# Patient Record
Sex: MALE | Race: BLACK OR AFRICAN AMERICAN | NOT HISPANIC OR LATINO | URBAN - METROPOLITAN AREA
[De-identification: names, ages, dates, MRNs, and addresses within clinical notes are randomized per-mention and may not be internally consistent; named-entity substitution may affect disease eponyms.]

---

## 2024-09-04 ENCOUNTER — OUTPATIENT (OUTPATIENT)
Dept: OUTPATIENT SERVICES | Facility: HOSPITAL | Age: 24
LOS: 1 days | Discharge: HOME | End: 2024-09-04
Payer: MEDICAID

## 2024-09-10 PROCEDURE — 90792 PSYCH DIAG EVAL W/MED SRVCS: CPT

## 2024-09-19 DIAGNOSIS — F25.9 SCHIZOAFFECTIVE DISORDER, UNSPECIFIED: ICD-10-CM

## 2024-11-11 ENCOUNTER — EMERGENCY (EMERGENCY)
Facility: HOSPITAL | Age: 24
LOS: 0 days | Discharge: HOME | End: 2024-11-11
Attending: STUDENT IN AN ORGANIZED HEALTH CARE EDUCATION/TRAINING PROGRAM
Payer: MEDICAID

## 2024-11-11 VITALS
TEMPERATURE: 98 F | HEART RATE: 70 BPM | OXYGEN SATURATION: 99 % | SYSTOLIC BLOOD PRESSURE: 119 MMHG | RESPIRATION RATE: 20 BRPM | DIASTOLIC BLOOD PRESSURE: 73 MMHG

## 2024-11-11 VITALS
OXYGEN SATURATION: 100 % | SYSTOLIC BLOOD PRESSURE: 118 MMHG | TEMPERATURE: 98 F | HEART RATE: 102 BPM | HEIGHT: 75 IN | RESPIRATION RATE: 20 BRPM | WEIGHT: 186.95 LBS | DIASTOLIC BLOOD PRESSURE: 78 MMHG

## 2024-11-11 DIAGNOSIS — R00.2 PALPITATIONS: ICD-10-CM

## 2024-11-11 DIAGNOSIS — R07.89 OTHER CHEST PAIN: ICD-10-CM

## 2024-11-11 DIAGNOSIS — F17.210 NICOTINE DEPENDENCE, CIGARETTES, UNCOMPLICATED: ICD-10-CM

## 2024-11-11 DIAGNOSIS — R00.0 TACHYCARDIA, UNSPECIFIED: ICD-10-CM

## 2024-11-11 DIAGNOSIS — K58.9 IRRITABLE BOWEL SYNDROME, UNSPECIFIED: ICD-10-CM

## 2024-11-11 LAB
ALBUMIN SERPL ELPH-MCNC: 4.1 G/DL — SIGNIFICANT CHANGE UP (ref 3.3–5)
ALP SERPL-CCNC: 66 U/L — SIGNIFICANT CHANGE UP (ref 40–120)
ALT FLD-CCNC: 33 U/L — SIGNIFICANT CHANGE UP (ref 12–78)
ANION GAP SERPL CALC-SCNC: 6 MMOL/L — SIGNIFICANT CHANGE UP (ref 5–17)
APTT BLD: 31.3 SEC — SIGNIFICANT CHANGE UP (ref 24.5–35.6)
AST SERPL-CCNC: 39 U/L — HIGH (ref 15–37)
BASOPHILS # BLD AUTO: 0.04 K/UL — SIGNIFICANT CHANGE UP (ref 0–0.2)
BASOPHILS NFR BLD AUTO: 0.6 % — SIGNIFICANT CHANGE UP (ref 0–2)
BILIRUB SERPL-MCNC: 1.2 MG/DL — SIGNIFICANT CHANGE UP (ref 0.2–1.2)
BUN SERPL-MCNC: 9 MG/DL — SIGNIFICANT CHANGE UP (ref 7–23)
CALCIUM SERPL-MCNC: 8.9 MG/DL — SIGNIFICANT CHANGE UP (ref 8.5–10.1)
CHLORIDE SERPL-SCNC: 109 MMOL/L — HIGH (ref 96–108)
CO2 SERPL-SCNC: 24 MMOL/L — SIGNIFICANT CHANGE UP (ref 22–31)
CREAT SERPL-MCNC: 1.07 MG/DL — SIGNIFICANT CHANGE UP (ref 0.5–1.3)
EGFR: 99 ML/MIN/1.73M2 — SIGNIFICANT CHANGE UP
EOSINOPHIL # BLD AUTO: 0.04 K/UL — SIGNIFICANT CHANGE UP (ref 0–0.5)
EOSINOPHIL NFR BLD AUTO: 0.6 % — SIGNIFICANT CHANGE UP (ref 0–6)
GLUCOSE SERPL-MCNC: 105 MG/DL — HIGH (ref 70–99)
HCT VFR BLD CALC: 36.6 % — LOW (ref 39–50)
HGB BLD-MCNC: 12.4 G/DL — LOW (ref 13–17)
IMM GRANULOCYTES NFR BLD AUTO: 0.1 % — SIGNIFICANT CHANGE UP (ref 0–0.9)
INR BLD: 1.31 RATIO — HIGH (ref 0.85–1.16)
LYMPHOCYTES # BLD AUTO: 1.27 K/UL — SIGNIFICANT CHANGE UP (ref 1–3.3)
LYMPHOCYTES # BLD AUTO: 17.8 % — SIGNIFICANT CHANGE UP (ref 13–44)
MAGNESIUM SERPL-MCNC: 1.8 MG/DL — SIGNIFICANT CHANGE UP (ref 1.6–2.6)
MCHC RBC-ENTMCNC: 28.6 PG — SIGNIFICANT CHANGE UP (ref 27–34)
MCHC RBC-ENTMCNC: 33.9 G/DL — SIGNIFICANT CHANGE UP (ref 32–36)
MCV RBC AUTO: 84.5 FL — SIGNIFICANT CHANGE UP (ref 80–100)
MONOCYTES # BLD AUTO: 0.56 K/UL — SIGNIFICANT CHANGE UP (ref 0–0.9)
MONOCYTES NFR BLD AUTO: 7.9 % — SIGNIFICANT CHANGE UP (ref 2–14)
NEUTROPHILS # BLD AUTO: 5.21 K/UL — SIGNIFICANT CHANGE UP (ref 1.8–7.4)
NEUTROPHILS NFR BLD AUTO: 73 % — SIGNIFICANT CHANGE UP (ref 43–77)
NRBC # BLD: 0 /100 WBCS — SIGNIFICANT CHANGE UP (ref 0–0)
PLATELET # BLD AUTO: 177 K/UL — SIGNIFICANT CHANGE UP (ref 150–400)
POTASSIUM SERPL-MCNC: 3.5 MMOL/L — SIGNIFICANT CHANGE UP (ref 3.5–5.3)
POTASSIUM SERPL-SCNC: 3.5 MMOL/L — SIGNIFICANT CHANGE UP (ref 3.5–5.3)
PROT SERPL-MCNC: 7.6 GM/DL — SIGNIFICANT CHANGE UP (ref 6–8.3)
PROTHROM AB SERPL-ACNC: 15.1 SEC — HIGH (ref 9.9–13.4)
RBC # BLD: 4.33 M/UL — SIGNIFICANT CHANGE UP (ref 4.2–5.8)
RBC # FLD: 13 % — SIGNIFICANT CHANGE UP (ref 10.3–14.5)
SODIUM SERPL-SCNC: 139 MMOL/L — SIGNIFICANT CHANGE UP (ref 135–145)
TROPONIN I, HIGH SENSITIVITY RESULT: 8.3 NG/L — SIGNIFICANT CHANGE UP
WBC # BLD: 7.13 K/UL — SIGNIFICANT CHANGE UP (ref 3.8–10.5)
WBC # FLD AUTO: 7.13 K/UL — SIGNIFICANT CHANGE UP (ref 3.8–10.5)

## 2024-11-11 PROCEDURE — 99284 EMERGENCY DEPT VISIT MOD MDM: CPT

## 2024-11-11 PROCEDURE — 93010 ELECTROCARDIOGRAM REPORT: CPT

## 2024-11-11 RX ORDER — SODIUM CHLORIDE 9 MG/ML
1000 INJECTION, SOLUTION INTRAMUSCULAR; INTRAVENOUS; SUBCUTANEOUS ONCE
Refills: 0 | Status: COMPLETED | OUTPATIENT
Start: 2024-11-11 | End: 2024-11-11

## 2024-11-11 RX ADMIN — SODIUM CHLORIDE 1000 MILLILITER(S): 9 INJECTION, SOLUTION INTRAMUSCULAR; INTRAVENOUS; SUBCUTANEOUS at 15:41

## 2024-11-11 NOTE — ED ADULT NURSE NOTE - OBJECTIVE STATEMENT
received er h28 biba c/o heart racing sensation and mild lightheaded sensation started today no shortness of breath mild chest tightness states didn't smoke marijuana x 4 days substituted nicotine instead denies other drug use a&ox4

## 2024-11-11 NOTE — ED PROVIDER NOTE - NSFOLLOWUPINSTRUCTIONS_ED_ALL_ED_FT
Rest, drink plenty of fluids.  Advance activity as tolerated.  Continue all previously prescribed medications as directed.  Follow up with your primary care physician in 48-72 hours- bring copies of your results.  Return to the ER for worsening or persistent symptoms, and/or ANY NEW OR CONCERNING SYMPTOMS. If you have issues obtaining follow up, please call: [phone] to obtain a doctor or specialist who takes your insurance in your area.  You may call [phone] to make an appointment with the internal medicine clinic.

## 2024-11-11 NOTE — ED ADULT NURSE NOTE - NSFALLUNIVINTERV_ED_ALL_ED
Bed/Stretcher in lowest position, wheels locked, appropriate side rails in place/Call bell, personal items and telephone in reach/Instruct patient to call for assistance before getting out of bed/chair/stretcher/Non-slip footwear applied when patient is off stretcher/[place] to call system/Physically safe environment - no spills, clutter or unnecessary equipment/Purposeful proactive rounding/Room/bathroom lighting operational, light cord in reach

## 2024-11-11 NOTE — ED PROVIDER NOTE - CHPI ED SYMPTOMS NEG
Overnight, when she was lying down, patient had recurrence of her dizziness. This was worse when getting up to go to the bathroom. She says it was true room spinning. No symptoms this morning. Sugar 78 at the time. No arrhythmias noted on tele.  
no cough/no dizziness/no fever/no nausea/no shortness of breath/no syncope/no vomiting/no chills

## 2024-11-11 NOTE — ED PROVIDER NOTE - ATTENDING APP SHARED VISIT CONTRIBUTION OF CARE
25 y/o M with PMH IBS here with palpitations today. Pt states he was smoking marijuana and cigarettes and felt his heart beating fast. Pt also reports having chest tightness. Denies dyspnea. Pt states he smokes marijuana daily but stopped smoking for 4 days and started again today. Denies nausea, vomiting, abdominal pain, recent travel, sick contact. Pt reports occasional drinking. Denies other drugs. Pt denies family history of heart disease or blood clots.  In the ED, mildly tachycardic.  EKG WNL  Suspect symptoms related to smoking marijuana  Plan for labs, patient declines CXR at this time, has equal breath sounds and is not hypoxic

## 2024-11-11 NOTE — ED PROVIDER NOTE - PATIENT PORTAL LINK FT
You can access the FollowMyHealth Patient Portal offered by [facility] by registering at the following website: [email or web address]. By joining [employer]’s FollowMyHealth portal, you will also be able to view your health information using other applications (apps) compatible with our system.

## 2024-11-11 NOTE — ED ADULT NURSE NOTE - CHIEF COMPLAINT QUOTE
Heart beating fast x 30 min, Left arm #18 IV smoked marijuana and increased nicotine cigarettes x4 days H/O IBS

## 2024-11-11 NOTE — ED PROVIDER NOTE - CLINICAL SUMMARY MEDICAL DECISION MAKING FREE TEXT BOX
25 y/o male with ibs here with palpitations today after smoking cigarettes and marijuana. Vs stable mildly tachycardic 102.   Symptoms likely due to smoking marijuana. Low suspicion for PE, acs,  as pt is low risk. NO hypoxic.   Will obtain cardiac labs, ekg, cxr, ivf and reassess. 25 y/o male with ibs here with palpitations today after smoking cigarettes and marijuana. Vs stable mildly tachycardic 102.   Symptoms likely due to smoking marijuana. Low suspicion for PE, acs,  as pt is low risk. NO hypoxic.   Will obtain cardiac labs, ekg, cxr, ivf and reassess.    labs reviewed and unremarkable. Troponin negative.   Pt refuse cxr and wants to leave. Pt reports feeling better. Vs stable.   Pt stable to be discharged home and follow up with PMD.

## 2024-11-11 NOTE — ED PROVIDER NOTE - OBJECTIVE STATEMENT
25 y/o male with IBS here with palpitations today. Pt states he was smoking marijuana and cigarettes and felt his heart beating fast. Pt also reports having chest tightness. Denies dyspnea. Pt states he smokes marijuana daily but stopped smoking for 4 days and started again today. Denies nausea, vomiting, abdominal pain, recent travel, sick contact. Pt reports occasional drinking. Denies other drugs. Pt denies family history of heart disease or blood clots.

## 2024-11-14 ENCOUNTER — EMERGENCY (EMERGENCY)
Facility: HOSPITAL | Age: 24
LOS: 0 days | Discharge: HOME | End: 2024-11-14
Attending: EMERGENCY MEDICINE
Payer: MEDICAID

## 2024-11-14 VITALS
SYSTOLIC BLOOD PRESSURE: 120 MMHG | TEMPERATURE: 98 F | HEIGHT: 75 IN | OXYGEN SATURATION: 100 % | HEART RATE: 90 BPM | DIASTOLIC BLOOD PRESSURE: 72 MMHG | WEIGHT: 186.95 LBS | RESPIRATION RATE: 18 BRPM

## 2024-11-14 DIAGNOSIS — R00.2 PALPITATIONS: ICD-10-CM

## 2024-11-14 DIAGNOSIS — Z77.22 CONTACT WITH AND (SUSPECTED) EXPOSURE TO ENVIRONMENTAL TOBACCO SMOKE (ACUTE) (CHRONIC): ICD-10-CM

## 2024-11-14 DIAGNOSIS — F20.9 SCHIZOPHRENIA, UNSPECIFIED: ICD-10-CM

## 2024-11-14 PROBLEM — K58.9 IRRITABLE BOWEL SYNDROME, UNSPECIFIED: Chronic | Status: ACTIVE | Noted: 2024-11-11

## 2024-11-14 PROCEDURE — 99284 EMERGENCY DEPT VISIT MOD MDM: CPT

## 2024-11-14 PROCEDURE — 93010 ELECTROCARDIOGRAM REPORT: CPT

## 2024-11-14 NOTE — ED PROVIDER NOTE - OBJECTIVE STATEMENT
24 year old male with PMH of schizophrenia not on meds at the moment presenting to ED due to palpitations after inhalation of marijuana earlier this afternoon. States no cardiac hx otherwise and after removing himself from smoking group symptoms have improved.

## 2024-11-14 NOTE — ED PROVIDER NOTE - CLINICAL SUMMARY MEDICAL DECISION MAKING FREE TEXT BOX
pt feeling better after not being in marijuana smoke - will dc with outpt PMD follow as needed, vitals and EKG without any abnormality noted   EKG sinus, no ST elevation/depression, no ectopy.

## 2024-11-14 NOTE — ED ADULT TRIAGE NOTE - CHIEF COMPLAINT QUOTE
biba c/o palpitations after exposure to second hand marijuana smoke, was seen in er on monday after episode of palpitations after smoking marijuana, denies smoking marijuana today denies chest pain or shortness of breath symptoms improved after ns 400ml bolus given per ems ivhl#18 r forearm

## 2024-11-14 NOTE — ED ADULT NURSE NOTE - OBJECTIVE STATEMENT
A&OX4 patient  C/O patient states palpitation after 2nd hand smoke of marijuana states smoked " weed" Monday seen in ED signed AMA' for same palpitation denies chest pain / Cough / SOB. PMH Schizophrenia/ IBS denies auditory hallucination or visual hallucination

## 2024-11-14 NOTE — ED PROVIDER NOTE - PATIENT PORTAL LINK FT
You can access the FollowMyHealth Patient Portal offered by [facility] by registering at the following website: [email or web address]. By joining [employer]’s FollowMyHealth portal, you will also be able to view your health information using other applications (apps) compatible with our system.

## 2024-11-14 NOTE — ED PROVIDER NOTE - NSFOLLOWUPINSTRUCTIONS_ED_ALL_ED_FT
Heart Palpitations    WHAT YOU NEED TO KNOW:    Heart palpitations are feelings that your heart races, jumps, throbs, or flutters. You may feel extra beats, no beats for a short time, or skipped beats. You may have these feelings in your chest, throat, or neck. They may happen when you are sitting, standing, or lying. Heart palpitations may be frightening, but are usually not caused by a serious problem.    DISCHARGE INSTRUCTIONS:    Call 911 or have someone else call for any of the following:    You have any of the following signs of a heart attack:  Squeezing, pressure, or pain in your chest    You may also have any of the following:  Discomfort or pain in your back, neck, jaw, stomach, or arm    Shortness of breath    Nausea or vomiting    Lightheadedness or a sudden cold sweat    You have any of the following signs of a stroke:  Numbness or drooping on one side of your face    Weakness in an arm or leg    Confusion or difficulty speaking    Dizziness, a severe headache, or vision loss    You faint or lose consciousness.  Seek care immediately if:    Your palpitations happen more often or last longer than usual.    You have palpitations and shortness of breath, nausea, sweating, or dizziness.  Contact your healthcare provider if:    You have questions or concerns about your condition or care.    Follow up with your healthcare provider as directed: You may need to follow up with a cardiologist. You may need tests to check for heart problems that cause palpitations. Write down your questions so you remember to ask them during your visits.    Keep a record: Write down when your palpitations start and stop, what you were doing when they started, and your symptoms. Keep track of what you ate or drank within a few hours of your palpitations. Include anything that seemed to help your symptoms, such as lying down or holding your breath. This record will help you and your healthcare provider learn what triggers your palpitations. Bring this record with you to your follow up visits.    Help prevent heart palpitations:    Manage stress and anxiety. Find ways to relax such as listening to music, meditating, or doing yoga. Exercise can also help decrease stress and anxiety. Talk to someone you trust about your stress or anxiety. You can also talk to a therapist.    Get plenty of sleep every night. Ask your healthcare provider how much sleep you need each night.    Do not drink caffeine or alcohol. Caffeine and alcohol can make your palpitations worse. Caffeine is found in soda, coffee, tea, chocolate, and drinks that increase your energy.    Do not smoke. Nicotine and other chemicals in cigarettes and cigars may damage your heart and blood vessels. Ask your healthcare provider for information if you currently smoke and need help to quit. E-cigarettes or smokeless tobacco still contain nicotine. Talk to your healthcare provider before you use these products.    Do not use illegal drugs. Talk to your healthcare provider if you use illegal drugs and want help to quit.

## 2024-11-29 ENCOUNTER — EMERGENCY (EMERGENCY)
Facility: HOSPITAL | Age: 24
LOS: 0 days | Discharge: HOME | End: 2024-11-30
Attending: STUDENT IN AN ORGANIZED HEALTH CARE EDUCATION/TRAINING PROGRAM
Payer: MEDICAID

## 2024-11-29 VITALS
DIASTOLIC BLOOD PRESSURE: 79 MMHG | RESPIRATION RATE: 18 BRPM | WEIGHT: 186.95 LBS | TEMPERATURE: 98 F | HEART RATE: 73 BPM | HEIGHT: 75 IN | OXYGEN SATURATION: 98 % | SYSTOLIC BLOOD PRESSURE: 124 MMHG

## 2024-11-29 DIAGNOSIS — R07.89 OTHER CHEST PAIN: ICD-10-CM

## 2024-11-29 DIAGNOSIS — R06.02 SHORTNESS OF BREATH: ICD-10-CM

## 2024-11-29 DIAGNOSIS — R00.1 BRADYCARDIA, UNSPECIFIED: ICD-10-CM

## 2024-11-29 DIAGNOSIS — Z87.891 PERSONAL HISTORY OF NICOTINE DEPENDENCE: ICD-10-CM

## 2024-11-29 PROCEDURE — 99285 EMERGENCY DEPT VISIT HI MDM: CPT

## 2024-11-29 NOTE — ED ADULT TRIAGE NOTE - CHIEF COMPLAINT QUOTE
Patient BIB [facility] c/o difficulty breathing due to vaping daily. Patient is speaking in full sentences with no distress. Oxygen at 98%.

## 2024-11-30 VITALS
HEART RATE: 73 BPM | DIASTOLIC BLOOD PRESSURE: 68 MMHG | OXYGEN SATURATION: 98 % | SYSTOLIC BLOOD PRESSURE: 115 MMHG | TEMPERATURE: 98 F | RESPIRATION RATE: 20 BRPM

## 2024-11-30 LAB
ANION GAP SERPL CALC-SCNC: 5 MMOL/L — SIGNIFICANT CHANGE UP (ref 5–17)
BASOPHILS # BLD AUTO: 0.06 K/UL — SIGNIFICANT CHANGE UP (ref 0–0.2)
BASOPHILS NFR BLD AUTO: 1 % — SIGNIFICANT CHANGE UP (ref 0–2)
BUN SERPL-MCNC: 15 MG/DL — SIGNIFICANT CHANGE UP (ref 7–23)
CALCIUM SERPL-MCNC: 8.7 MG/DL — SIGNIFICANT CHANGE UP (ref 8.5–10.1)
CHLORIDE SERPL-SCNC: 108 MMOL/L — SIGNIFICANT CHANGE UP (ref 96–108)
CO2 SERPL-SCNC: 26 MMOL/L — SIGNIFICANT CHANGE UP (ref 22–31)
CREAT SERPL-MCNC: 1.12 MG/DL — SIGNIFICANT CHANGE UP (ref 0.5–1.3)
EGFR: 94 ML/MIN/1.73M2 — SIGNIFICANT CHANGE UP
EOSINOPHIL # BLD AUTO: 0.09 K/UL — SIGNIFICANT CHANGE UP (ref 0–0.5)
EOSINOPHIL NFR BLD AUTO: 1.5 % — SIGNIFICANT CHANGE UP (ref 0–6)
GLUCOSE SERPL-MCNC: 103 MG/DL — HIGH (ref 70–99)
HCT VFR BLD CALC: 38.9 % — LOW (ref 39–50)
HGB BLD-MCNC: 13 G/DL — SIGNIFICANT CHANGE UP (ref 13–17)
IMM GRANULOCYTES NFR BLD AUTO: 0.2 % — SIGNIFICANT CHANGE UP (ref 0–0.9)
LYMPHOCYTES # BLD AUTO: 2.01 K/UL — SIGNIFICANT CHANGE UP (ref 1–3.3)
LYMPHOCYTES # BLD AUTO: 32.6 % — SIGNIFICANT CHANGE UP (ref 13–44)
MCHC RBC-ENTMCNC: 28.1 PG — SIGNIFICANT CHANGE UP (ref 27–34)
MCHC RBC-ENTMCNC: 33.4 G/DL — SIGNIFICANT CHANGE UP (ref 32–36)
MCV RBC AUTO: 84.2 FL — SIGNIFICANT CHANGE UP (ref 80–100)
MONOCYTES # BLD AUTO: 0.52 K/UL — SIGNIFICANT CHANGE UP (ref 0–0.9)
MONOCYTES NFR BLD AUTO: 8.4 % — SIGNIFICANT CHANGE UP (ref 2–14)
NEUTROPHILS # BLD AUTO: 3.47 K/UL — SIGNIFICANT CHANGE UP (ref 1.8–7.4)
NEUTROPHILS NFR BLD AUTO: 56.3 % — SIGNIFICANT CHANGE UP (ref 43–77)
NRBC # BLD: 0 /100 WBCS — SIGNIFICANT CHANGE UP (ref 0–0)
PLATELET # BLD AUTO: 188 K/UL — SIGNIFICANT CHANGE UP (ref 150–400)
POTASSIUM SERPL-MCNC: 3.7 MMOL/L — SIGNIFICANT CHANGE UP (ref 3.5–5.3)
POTASSIUM SERPL-SCNC: 3.7 MMOL/L — SIGNIFICANT CHANGE UP (ref 3.5–5.3)
RBC # BLD: 4.62 M/UL — SIGNIFICANT CHANGE UP (ref 4.2–5.8)
RBC # FLD: 12.5 % — SIGNIFICANT CHANGE UP (ref 10.3–14.5)
SODIUM SERPL-SCNC: 139 MMOL/L — SIGNIFICANT CHANGE UP (ref 135–145)
TROPONIN I, HIGH SENSITIVITY RESULT: 6.2 NG/L — SIGNIFICANT CHANGE UP
WBC # BLD: 6.16 K/UL — SIGNIFICANT CHANGE UP (ref 3.8–10.5)
WBC # FLD AUTO: 6.16 K/UL — SIGNIFICANT CHANGE UP (ref 3.8–10.5)

## 2024-11-30 PROCEDURE — 93010 ELECTROCARDIOGRAM REPORT: CPT

## 2024-11-30 PROCEDURE — 71046 X-RAY EXAM CHEST 2 VIEWS: CPT | Mod: 26

## 2024-11-30 NOTE — ED PROVIDER NOTE - NSFOLLOWUPINSTRUCTIONS_ED_ALL_ED_FT
followup with primary care and pulmonologist in next 1-7 days    Shortness of breath    Shortness of breath (dyspnea) means you have trouble breathing and could indicate a medical problem. Causes include lung disease, heart disease, low amount of red blood cells (anemia), poor physical fitness, being overweight, smoking, etc. Your health care provider today may not be able to find a cause for your shortness of breath after your exam. In this case, it is important to have a follow-up exam with your primary care physician as instructed. If medicines were prescribed, take them as directed for the full length of time directed. Refrain from tobacco products.    SEEK IMMEDIATE MEDICAL CARE IF YOU HAVE ANY OF THE FOLLOWING SYMPTOMS: worsening shortness of breath, chest pain, back pain, abdominal pain, fever, coughing up blood, lightheadedness/dizziness.

## 2024-11-30 NOTE — ED PROVIDER NOTE - NSFOLLOWUPCLINICS_GEN_ALL_ED_FT
[facility] Pulmonolgy and Sleep Medicine  Pulmonology  [address] [place] [address], [place] [address]  Phone: [phone]  Fax:

## 2024-11-30 NOTE — ED PROVIDER NOTE - PHYSICAL EXAMINATION
General: Well appearing male in no acute distress  HEENT: Normocephalic, atraumatic. Moist mucous membranes. Oropharynx clear. No lymphadenopathy.  Eyes: No scleral icterus. EOMI. BRAD.  Neck:. Soft and supple. Full ROM without pain. No midline tenderness  Cardiac: Regular rate and regular rhythm. No murmurs, rubs, gallops. Peripheral pulses 2+ and symmetric. No LE edema.  Resp: Lungs CTAB. Speaking in full sentences. No wheezes, rales or rhonchi.  Abd: Soft, non-tender, non-distended. No guarding or rebound. No scars, masses, or lesions.  Back: Spine midline and non-tender. No CVA tenderness.    Skin: No rashes, abrasions, or lacerations.  Neuro: AO x 3. Moves all extremities symmetrically. Motor strength and sensation grossly intact.

## 2024-11-30 NOTE — ED PROVIDER NOTE - OBJECTIVE STATEMENT
23 y/o M no pmhx presents w/ shortness of breath. states symptoms began after he used a vape w/ nicotine. states he was here few days ago for similar complaint and left before xray chest was performed. states he knows he needs to stop smoking. had transient chest discomfort at time of smoking vape but none currently. denies fever/chills. denies cough. denies illicit drug use. former cigarette user.

## 2024-11-30 NOTE — ED PROVIDER NOTE - CLINICAL SUMMARY MEDICAL DECISION MAKING FREE TEXT BOX
23 y/o M no pmhx presents w/ shortness of breath. states symptoms began after he used a vape w/ nicotine. states he was here few days ago for similar complaint and left before xray chest was performed. states he knows he needs to stop smoking. had transient chest discomfort at time of smoking vape but none currently. denies fever/chills. denies cough. denies illicit drug use. former cigarette user.   lungs ctab, not in any resp. distress, well appearing  stable vitals, not hypoxic  EKG sinus bradycardia, no stemi   cxr - assess lung fields given sob w/ vaping use  asymptomatic currently on my evaluation.   check lytes/labs. 25 y/o M no pmhx presents w/ shortness of breath. states symptoms began after he used a vape w/ nicotine. states he was here few days ago for similar complaint and left before xray chest was performed. states he knows he needs to stop smoking. had transient chest discomfort at time of smoking vape but none currently. denies fever/chills. denies cough. denies illicit drug use. former cigarette user.   lungs ctab, not in any resp. distress, well appearing  stable vitals, not hypoxic  EKG sinus bradycardia, no stemi   cxr - assess lung fields given sob w/ vaping use  asymptomatic currently on my evaluation.   check lytes/labs.  patient requesting to know his blood type, no bleeding. will send type and screen. 25 y/o M no pmhx presents w/ shortness of breath. states symptoms began after he used a vape w/ nicotine. states he was here few days ago for similar complaint and left before xray chest was performed. states he knows he needs to stop smoking. had transient chest discomfort at time of smoking vape but none currently. denies fever/chills. denies cough. denies illicit drug use. former cigarette user.   lungs ctab, not in any resp. distress, well appearing  stable vitals, not hypoxic  EKG sinus bradycardia, no stemi   cxr - assess lung fields given sob w/ vaping use  asymptomatic currently on my evaluation.   check lytes/labs.  patient requesting to know his blood type, no bleeding. will send type and screen.    cxr wet read no acute findings. discharge. smoking/vaping cessation emphasized to patient. f/u with primary care.

## 2024-11-30 NOTE — ED PROVIDER NOTE - CARE PROVIDER_API CALL
James Francis  Internal Medicine  [address] [place] [address]  Phone: [phone]  Fax: [phone]  Follow Up Time: 4-6 Days    Minerva Thompson  Pulmonary Disease  [address] [facility] [place] [address]  Phone: [phone]  Fax: [phone]  Follow Up Time: 4-6 Days

## 2024-11-30 NOTE — ED PROVIDER NOTE - PATIENT PORTAL LINK FT
You can access the FollowMyHealth Patient Portal offered by [facility] by registering at the following website: [email or web address]. By joining [employer]’s FollowMyHealth portal, you will also be able to view your health information using other applications (apps) compatible with our system.

## 2024-11-30 NOTE — ED PROVIDER NOTE - PROVIDER TOKENS
PROVIDER:[TOKEN:[[id number]],FOLLOWUP:[4-6 Days]],PROVIDER:[TOKEN:[[id number]],FOLLOWUP:[4-6 Days]]

## 2024-11-30 NOTE — ED ADULT NURSE NOTE - OBJECTIVE STATEMENT
Verified Results  HEMOGLOBIN A1C GLYCOSYLATED [id number] 09:50AM Govindantonio Galvez   [Aug 3, 2018 2:35PM CARLOS MACDONALD]  Pt informed:  - DM is not well controlled (A1c: 7.9, goal <7.0). Start Glipizide 5mg BID with meals (Rx sent) and continue Metformin 1000mg BID). +microalbuminuria, on ACEi.  - HDL was low (24, goal >60), and triglycerides are elevated (292, goal <100) with normal LDL. Discontinue Simvastatin and start Atorvastatin 40mg bedtime (Rx sent). Educated on low fat diet, wt loss and exercise. - Thyroid is well controlled now, continue same dose of Levothyroxine 75mcg daily.  - Normal chemistry and liver. Test Name Result Flag Reference   HEMOGLOBIN A1C GLYH 7.9 % H 4.5-5.6   ----DIABETIC SCREENING---  NON DIABETIC                 <5.7%  INCREASED RISK                5.7-6.4%  DIAGNOSTIC FOR DIABETES      >6.4%     ----DIABETIC CONTROL---     A1C%           eAG mg/dL  6.0            126  6.5            140  7.0            154  7.5            169  8.0            183  8.5            197  9.0            212  9.5            226  10.0           240     MICROALBUMIN, URINE [id number] 09:50AM Cricket MACDONALD   [Aug 3, 2018 2:35PM CARLOS MACDONALD]  Pt informed:  - DM is not well controlled (A1c: 7.9, goal <7.0). Start Glipizide 5mg BID with meals (Rx sent) and continue Metformin 1000mg BID). +microalbuminuria, on ACEi.  - HDL was low (24, goal >60), and triglycerides are elevated (292, goal <100) with normal LDL. Discontinue Simvastatin and start Atorvastatin 40mg bedtime (Rx sent). Educated on low fat diet, wt loss and exercise. - Thyroid is well controlled now, continue same dose of Levothyroxine 75mcg daily.  - Normal chemistry and liver. Test Name Result Flag Reference   MICROALBUMIN 66.40 mg/dl     CREATININE RANDOM URINE 168.00 mg/dl     MICROALB/CREAT RATIO 395.2 mg/g H <30   Consistent with clinical albuminuria.      TSH [id number] 09:50AM CARLOS MACDONALD   [Aug 3, 2018 2:35PM Dominick Rodriguez  Pt informed:  - DM is not well controlled (A1c: 7.9, goal <7.0). Start Glipizide 5mg BID with meals (Rx sent) and continue Metformin 1000mg BID). +microalbuminuria, on ACEi.  - HDL was low (24, goal >60), and triglycerides are elevated (292, goal <100) with normal LDL. Discontinue Simvastatin and start Atorvastatin 40mg bedtime (Rx sent). Educated on low fat diet, wt loss and exercise. - Thyroid is well controlled now, continue same dose of Levothyroxine 75mcg daily.  - Normal chemistry and liver. Test Name Result Flag Reference   TSH 2.880 mcUnits/mL  7.719-0.016     COMP METABOLIC PNL [id number] 45:86QH Dominick Rodriguez   [Aug 3, 2018 2:35PM Dominick Rodriguez  Pt informed:  - DM is not well controlled (A1c: 7.9, goal <7.0). Start Glipizide 5mg BID with meals (Rx sent) and continue Metformin 1000mg BID). +microalbuminuria, on ACEi.  - HDL was low (24, goal >60), and triglycerides are elevated (292, goal <100) with normal LDL. Discontinue Simvastatin and start Atorvastatin 40mg bedtime (Rx sent). Educated on low fat diet, wt loss and exercise. - Thyroid is well controlled now, continue same dose of Levothyroxine 75mcg daily.  - Normal chemistry and liver. Test Name Result Flag Reference   SODIUM 138 mmol/L  135-145   POTASSIUM 4.8 mmol/L  3.4-5.1   CHLORIDE 105 mmol/L     CARBON DIOXIDE 24 mmol/L  21-32   ANION GAP 14 mmol/L  10-20   GLUCOSE 160 mg/dl H 65-99   BUN 12 mg/dl  6-20   CREATININE 0.74 mg/dl  0.67-1.17   GFR EST.  AMER >90     eGFR results = or >90 mL/min/1.73m2 = Normal kidney function. GFR EST. NONAFRI AMER >90     eGFR results = or >90 mL/min/1.73m2 = Normal kidney function.    BUN/CREATININE RATIO 16  7-25   BILIRUBIN TOTAL 0.9 mg/dl  0.2-1.0   GOT/AST 33 Units/L  <38   ALKALINE PHOSPHATASE 72 Units/L     ALBUMIN 4.4 g/dl  3.6-5.1   TOTAL PROTEIN 8.1 g/dl  6.4-8.2   GLOBULIN (CALCULATED) 3.7 g/dl  2.0-4.0   A/G RATIO 1.2  1.0-2.4 CALCIUM 9.5 mg/dl  8.4-10.2   GPT/ALT 45 Units/L  <79   FASTING STATUS UNKNOWN hrs       LIPID PNL W/ RFX [id number] 09:50AM Ruthie Castellon   [Aug 3, 2018 2:35PM CARLOS MACDONALD]  Pt informed:  - DM is not well controlled (A1c: 7.9, goal <7.0). Start Glipizide 5mg BID with meals (Rx sent) and continue Metformin 1000mg BID). +microalbuminuria, on ACEi.  - HDL was low (24, goal >60), and triglycerides are elevated (292, goal <100) with normal LDL. Discontinue Simvastatin and start Atorvastatin 40mg bedtime (Rx sent). Educated on low fat diet, wt loss and exercise. - Thyroid is well controlled now, continue same dose of Levothyroxine 75mcg daily.  - Normal chemistry and liver. Test Name Result Flag Reference   FASTING STATUS UNKNOWN hrs     CHOLESTEROL 114 mg/dl  <200   Desirable            <200  Borderline High      200 to 239  High                 >=240   HDL CHOLESTEROL 24 mg/dl L >39   Low            <40  Borderline Low 40 to 49  Near Optimal   50 to 59  Optimal        >=60   TRIGLYCERIDES 292 mg/dl H <150   Normal                   <150  Borderline High          150 to 199  High                     200 to 499  Very High                >=500   LDL CHOLESTEROL (CALCULATED) 32 mg/dl  <130   OPTIMAL               <100  NEAR OPTIMAL          100-129  BORDERLINE HIGH       130-159  HIGH                  160-189  VERY HIGH             >=190   NON-HDL CHOLESTEROL 90 mg/dl     Therapeutic Target:  CHD and risk equivalents <130  Multiple risk factors    <160  0 to 1 risk factors      <190   CHOLESTEROL/HDL RATIO 4.8 H <4.5       Message   - Inform pt that his DM is not well controlled (A1c: 7.9, goal <7.0). Start Glipizide 5mg BID with meals (Rx sent) and continue Metformin 1000mg BID). - Urine continue being positive for proteins (this means, kidney damage from uncontrolled diabetes).  Continue taking Lisinopril to help protect kidneys.   - HDL was low (24, goal >60), and triglycerides are elevated (292, goal <100) with normal LDL. Discontinue Simvastatin and start Atorvastatin 40mg bedtime (Rx sent). Educate on low fat diet (ex. avoid fried food, pork, butter, whole milk, etc.), exercise and loosing weight will help you to control these levels too. - Thyroid is well controlled now, continue same dose of Levothyroxine 75mcg daily.   - Normal chemistry and liver. 24Y A&OX4 no PMH c/o difficulty breathing. states he vapes everyday and quit smoking marijuana recently due to having palpitations. pt speaking in complete sentences in ED. no respiratory distress noted.

## 2024-11-30 NOTE — ED PROVIDER NOTE - CARE PROVIDERS DIRECT ADDRESSES
[email or web address]

## 2025-06-03 ENCOUNTER — EMERGENCY (EMERGENCY)
Facility: HOSPITAL | Age: 25
LOS: 0 days | Discharge: LEFT BEFORE DISCHARGE | End: 2025-06-03
Attending: STUDENT IN AN ORGANIZED HEALTH CARE EDUCATION/TRAINING PROGRAM
Payer: MEDICAID

## 2025-06-03 ENCOUNTER — EMERGENCY (EMERGENCY)
Facility: HOSPITAL | Age: 25
LOS: 0 days | Discharge: HOME | End: 2025-06-03
Attending: STUDENT IN AN ORGANIZED HEALTH CARE EDUCATION/TRAINING PROGRAM
Payer: MEDICAID

## 2025-06-03 VITALS
DIASTOLIC BLOOD PRESSURE: 76 MMHG | SYSTOLIC BLOOD PRESSURE: 125 MMHG | TEMPERATURE: 97 F | HEIGHT: 76 IN | WEIGHT: 186.95 LBS | OXYGEN SATURATION: 97 % | HEART RATE: 70 BPM | RESPIRATION RATE: 17 BRPM

## 2025-06-03 VITALS
HEIGHT: 76 IN | RESPIRATION RATE: 17 BRPM | SYSTOLIC BLOOD PRESSURE: 136 MMHG | HEART RATE: 100 BPM | OXYGEN SATURATION: 99 % | WEIGHT: 179.9 LBS | DIASTOLIC BLOOD PRESSURE: 85 MMHG

## 2025-06-03 VITALS
RESPIRATION RATE: 19 BRPM | TEMPERATURE: 98 F | HEART RATE: 72 BPM | SYSTOLIC BLOOD PRESSURE: 129 MMHG | DIASTOLIC BLOOD PRESSURE: 95 MMHG | OXYGEN SATURATION: 99 %

## 2025-06-03 DIAGNOSIS — Z53.29 PROCEDURE AND TREATMENT NOT CARRIED OUT BECAUSE OF PATIENT'S DECISION FOR OTHER REASONS: ICD-10-CM

## 2025-06-03 DIAGNOSIS — R06.02 SHORTNESS OF BREATH: ICD-10-CM

## 2025-06-03 LAB
ALBUMIN SERPL ELPH-MCNC: 4.3 G/DL — SIGNIFICANT CHANGE UP (ref 3.3–5)
ALBUMIN SERPL ELPH-MCNC: 4.4 G/DL — SIGNIFICANT CHANGE UP (ref 3.3–5)
ALP SERPL-CCNC: 85 U/L — SIGNIFICANT CHANGE UP (ref 40–120)
ALP SERPL-CCNC: 86 U/L — SIGNIFICANT CHANGE UP (ref 40–120)
ALT FLD-CCNC: 31 U/L — SIGNIFICANT CHANGE UP (ref 12–78)
ALT FLD-CCNC: 33 U/L — SIGNIFICANT CHANGE UP (ref 12–78)
ANION GAP SERPL CALC-SCNC: 7 MMOL/L — SIGNIFICANT CHANGE UP (ref 5–17)
ANION GAP SERPL CALC-SCNC: 8 MMOL/L — SIGNIFICANT CHANGE UP (ref 5–17)
AST SERPL-CCNC: 24 U/L — SIGNIFICANT CHANGE UP (ref 15–37)
AST SERPL-CCNC: 25 U/L — SIGNIFICANT CHANGE UP (ref 15–37)
BASOPHILS # BLD AUTO: 0.02 K/UL — SIGNIFICANT CHANGE UP (ref 0–0.2)
BASOPHILS # BLD AUTO: 0.03 K/UL — SIGNIFICANT CHANGE UP (ref 0–0.2)
BASOPHILS NFR BLD AUTO: 0.4 % — SIGNIFICANT CHANGE UP (ref 0–2)
BASOPHILS NFR BLD AUTO: 0.5 % — SIGNIFICANT CHANGE UP (ref 0–2)
BILIRUB SERPL-MCNC: 0.8 MG/DL — SIGNIFICANT CHANGE UP (ref 0.2–1.2)
BILIRUB SERPL-MCNC: 1.1 MG/DL — SIGNIFICANT CHANGE UP (ref 0.2–1.2)
BUN SERPL-MCNC: 14 MG/DL — SIGNIFICANT CHANGE UP (ref 7–23)
BUN SERPL-MCNC: 14 MG/DL — SIGNIFICANT CHANGE UP (ref 7–23)
CALCIUM SERPL-MCNC: 9.4 MG/DL — SIGNIFICANT CHANGE UP (ref 8.5–10.1)
CALCIUM SERPL-MCNC: 9.5 MG/DL — SIGNIFICANT CHANGE UP (ref 8.5–10.1)
CHLORIDE SERPL-SCNC: 106 MMOL/L — SIGNIFICANT CHANGE UP (ref 96–108)
CHLORIDE SERPL-SCNC: 107 MMOL/L — SIGNIFICANT CHANGE UP (ref 96–108)
CO2 SERPL-SCNC: 23 MMOL/L — SIGNIFICANT CHANGE UP (ref 22–31)
CO2 SERPL-SCNC: 24 MMOL/L — SIGNIFICANT CHANGE UP (ref 22–31)
CREAT SERPL-MCNC: 1.05 MG/DL — SIGNIFICANT CHANGE UP (ref 0.5–1.3)
CREAT SERPL-MCNC: 1.05 MG/DL — SIGNIFICANT CHANGE UP (ref 0.5–1.3)
D DIMER BLD IA.RAPID-MCNC: <150 NG/ML DDU — SIGNIFICANT CHANGE UP
EGFR: 101 ML/MIN/1.73M2 — SIGNIFICANT CHANGE UP
EOSINOPHIL # BLD AUTO: 0.07 K/UL — SIGNIFICANT CHANGE UP (ref 0–0.5)
EOSINOPHIL # BLD AUTO: 0.1 K/UL — SIGNIFICANT CHANGE UP (ref 0–0.5)
EOSINOPHIL NFR BLD AUTO: 1.1 % — SIGNIFICANT CHANGE UP (ref 0–6)
EOSINOPHIL NFR BLD AUTO: 1.8 % — SIGNIFICANT CHANGE UP (ref 0–6)
GLUCOSE SERPL-MCNC: 92 MG/DL — SIGNIFICANT CHANGE UP (ref 70–99)
GLUCOSE SERPL-MCNC: 98 MG/DL — SIGNIFICANT CHANGE UP (ref 70–99)
HCT VFR BLD CALC: 43.7 % — SIGNIFICANT CHANGE UP (ref 39–50)
HCT VFR BLD CALC: 45.1 % — SIGNIFICANT CHANGE UP (ref 39–50)
HGB BLD-MCNC: 13.9 G/DL — SIGNIFICANT CHANGE UP (ref 13–17)
HGB BLD-MCNC: 14.7 G/DL — SIGNIFICANT CHANGE UP (ref 13–17)
IMM GRANULOCYTES NFR BLD AUTO: 0.2 % — SIGNIFICANT CHANGE UP (ref 0–0.9)
IMM GRANULOCYTES NFR BLD AUTO: 0.4 % — SIGNIFICANT CHANGE UP (ref 0–0.9)
LYMPHOCYTES # BLD AUTO: 1.45 K/UL — SIGNIFICANT CHANGE UP (ref 1–3.3)
LYMPHOCYTES # BLD AUTO: 1.9 K/UL — SIGNIFICANT CHANGE UP (ref 1–3.3)
LYMPHOCYTES # BLD AUTO: 25.4 % — SIGNIFICANT CHANGE UP (ref 13–44)
LYMPHOCYTES # BLD AUTO: 30.4 % — SIGNIFICANT CHANGE UP (ref 13–44)
MAGNESIUM SERPL-MCNC: 1.9 MG/DL — SIGNIFICANT CHANGE UP (ref 1.6–2.6)
MCHC RBC-ENTMCNC: 27 PG — SIGNIFICANT CHANGE UP (ref 27–34)
MCHC RBC-ENTMCNC: 27.5 PG — SIGNIFICANT CHANGE UP (ref 27–34)
MCHC RBC-ENTMCNC: 31.8 G/DL — LOW (ref 32–36)
MCHC RBC-ENTMCNC: 32.6 G/DL — SIGNIFICANT CHANGE UP (ref 32–36)
MCV RBC AUTO: 84.3 FL — SIGNIFICANT CHANGE UP (ref 80–100)
MCV RBC AUTO: 84.9 FL — SIGNIFICANT CHANGE UP (ref 80–100)
MONOCYTES # BLD AUTO: 0.4 K/UL — SIGNIFICANT CHANGE UP (ref 0–0.9)
MONOCYTES # BLD AUTO: 0.54 K/UL — SIGNIFICANT CHANGE UP (ref 0–0.9)
MONOCYTES NFR BLD AUTO: 7 % — SIGNIFICANT CHANGE UP (ref 2–14)
MONOCYTES NFR BLD AUTO: 8.6 % — SIGNIFICANT CHANGE UP (ref 2–14)
NEUTROPHILS # BLD AUTO: 3.71 K/UL — SIGNIFICANT CHANGE UP (ref 1.8–7.4)
NEUTROPHILS # BLD AUTO: 3.72 K/UL — SIGNIFICANT CHANGE UP (ref 1.8–7.4)
NEUTROPHILS NFR BLD AUTO: 59.2 % — SIGNIFICANT CHANGE UP (ref 43–77)
NEUTROPHILS NFR BLD AUTO: 65 % — SIGNIFICANT CHANGE UP (ref 43–77)
NRBC BLD AUTO-RTO: 0 /100 WBCS — SIGNIFICANT CHANGE UP (ref 0–0)
NRBC BLD AUTO-RTO: 0 /100 WBCS — SIGNIFICANT CHANGE UP (ref 0–0)
NT-PROBNP SERPL-SCNC: 8 PG/ML — SIGNIFICANT CHANGE UP (ref 0–125)
PLATELET # BLD AUTO: 188 K/UL — SIGNIFICANT CHANGE UP (ref 150–400)
PLATELET # BLD AUTO: 194 K/UL — SIGNIFICANT CHANGE UP (ref 150–400)
POTASSIUM SERPL-MCNC: 4.1 MMOL/L — SIGNIFICANT CHANGE UP (ref 3.5–5.3)
POTASSIUM SERPL-MCNC: 4.1 MMOL/L — SIGNIFICANT CHANGE UP (ref 3.5–5.3)
POTASSIUM SERPL-SCNC: 4.1 MMOL/L — SIGNIFICANT CHANGE UP (ref 3.5–5.3)
POTASSIUM SERPL-SCNC: 4.1 MMOL/L — SIGNIFICANT CHANGE UP (ref 3.5–5.3)
PROT SERPL-MCNC: 8.3 GM/DL — SIGNIFICANT CHANGE UP (ref 6–8.3)
PROT SERPL-MCNC: 8.6 GM/DL — HIGH (ref 6–8.3)
RBC # BLD: 5.15 M/UL — SIGNIFICANT CHANGE UP (ref 4.2–5.8)
RBC # BLD: 5.35 M/UL — SIGNIFICANT CHANGE UP (ref 4.2–5.8)
RBC # FLD: 12.5 % — SIGNIFICANT CHANGE UP (ref 10.3–14.5)
RBC # FLD: 12.6 % — SIGNIFICANT CHANGE UP (ref 10.3–14.5)
SODIUM SERPL-SCNC: 137 MMOL/L — SIGNIFICANT CHANGE UP (ref 135–145)
SODIUM SERPL-SCNC: 138 MMOL/L — SIGNIFICANT CHANGE UP (ref 135–145)
TROPONIN I, HIGH SENSITIVITY RESULT: 6.5 NG/L — SIGNIFICANT CHANGE UP
TROPONIN I, HIGH SENSITIVITY RESULT: 8 NG/L — SIGNIFICANT CHANGE UP
WBC # BLD: 5.71 K/UL — SIGNIFICANT CHANGE UP (ref 3.8–10.5)
WBC # BLD: 6.26 K/UL — SIGNIFICANT CHANGE UP (ref 3.8–10.5)
WBC # FLD AUTO: 5.71 K/UL — SIGNIFICANT CHANGE UP (ref 3.8–10.5)
WBC # FLD AUTO: 6.26 K/UL — SIGNIFICANT CHANGE UP (ref 3.8–10.5)

## 2025-06-03 PROCEDURE — 99284 EMERGENCY DEPT VISIT MOD MDM: CPT

## 2025-06-03 PROCEDURE — 99284 EMERGENCY DEPT VISIT MOD MDM: CPT | Mod: 25

## 2025-06-03 PROCEDURE — 71046 X-RAY EXAM CHEST 2 VIEWS: CPT | Mod: 26

## 2025-06-03 RX ORDER — ALBUTEROL SULFATE 2.5 MG/3ML
2 VIAL, NEBULIZER (ML) INHALATION ONCE
Refills: 0 | Status: COMPLETED | OUTPATIENT
Start: 2025-06-03 | End: 2025-06-03

## 2025-06-03 RX ADMIN — Medication 2 PUFF(S): at 20:08

## 2025-06-03 NOTE — ED PROVIDER NOTE - CLINICAL SUMMARY MEDICAL DECISION MAKING FREE TEXT BOX
25-year-old male pmhx of no significant comes to ED w/ reported shortness of breath after eating a turkey sandwich 4 hours prior. Patient reports that they have previously eaten the same brand turkey sandwich before without any issues. Symptoms started randomly. Their pain/symptom is mild, mediated with rest. Otherwise ROS negative.    General: NAD  HEENT: NCAT, PERRL   Cardiac: RRR, no murmurs, 2+ peripheral pulses   Chest: CTAB  Abdomen: soft, non-distended, bowel sounds present, no ttp, no rebound or guarding   Extremities: no peripheral edema, calf tenderness, or leg size discrepancies   Skin: no rashes   Neuro: AAOx4, 5+motor, sensory grossly intact   Psych: mood and affect appropriate    Impression: 25-year-old male pmhx of no significant comes to ED w/ reported mild shortness of breath after eating a turkey sandwich 4 hours prior. Plan to work up for etiology of shortness of breath. Ddx includes viral illness, pna, possible cardiac etiology. Symptoms unlikely to be an allergy due to repeated prior exposure without prior symptoms.    Ordered labs, imaging, medications for diagnosis, management, and treatment.

## 2025-06-03 NOTE — ED ADULT NURSE NOTE - NSFALLRISKASMTTYPE_ED_ALL_ED
[FreeTextEntry1] : pt is doing well after breast aug surgery pod 5   she is c/o more discomfort r side  .  pt has good shape and symmetry  scars well healed  no allergic response.  The patient denies fever,chills, shortness of breath, chest pain, calf pain JOSE  has had uncomplicated recovery from surgery and anesthesia\par .\par  Initial (On Arrival)

## 2025-06-03 NOTE — ED PROVIDER NOTE - PATIENT PORTAL LINK FT
You can access the FollowMyHealth Patient Portal offered by [facility] by registering at the following website: [email or web address]. By joining [employer]’s FollowMyHealth portal, you will also be able to view your health information using other applications (apps) compatible with our system.

## 2025-06-03 NOTE — ED PROVIDER NOTE - PROGRESS NOTE DETAILS
Patient refusing work up including labs, medications, prescriptions, ekg, imaging explained to patient the risk of declining evaluation which includes worsening quality of life and possible death.  Patient repeated understanding,  exemplifies understanding of risks, accepts risks and requested to be discharged against medical advice. Patient signed AMA form. Patient informed to return to any emergency department to complete evaluation, given emergent PCP follow up and return precautions. Patient agrees with plan.    The patient wishes to be discharged against medical advice. I have assessed the patient's mental status and  the patient has capacity to make this decision. I have explained the risks of leaving without full treatment, including severe disability and death, which the patient understands and is willing to accept. I have answered all of the patient's questions. I reiterated my medical opinion and advised the patient to return at any time. We discussed the further workup outside of the current visit and return precautions. Patient allowed labs however refusing complete work up including medications, prescriptions, ekg explained to patient the risk of declining evaluation which includes worsening quality of life and possible death.  Patient repeated understanding,  exemplifies understanding of risks, accepts risks and requested to be discharged against medical advice. Patient signed AMA form. Patient informed to return to any emergency department to complete evaluation, given emergent PCP follow up and return precautions. Patient agrees with plan.    The patient wishes to be discharged against medical advice. I have assessed the patient's mental status and  the patient has capacity to make this decision. I have explained the risks of leaving without full treatment, including severe disability and death, which the patient understands and is willing to accept. I have answered all of the patient's questions. I reiterated my medical opinion and advised the patient to return at any time. We discussed the further workup outside of the current visit and return precautions.

## 2025-06-03 NOTE — ED ADULT NURSE NOTE - OBJECTIVE STATEMENT
Patient reports "Shortness of breath since 3am-5am this morning." Also reports intermittent Chest pain. Respirations even non labored. Appears in no acute distress. Lungs clear to apices bilateral. Patient refused IV nand Flu swab

## 2025-06-03 NOTE — ED ADULT TRIAGE NOTE - CHIEF COMPLAINT QUOTE
BIBA,  difficulty breathing after eating this morning. [place] sandwich from store, he ate between 3-5 am.   pt refusing temp in triage.  pt talking in clear, full, sentences, no acute distress noted. BIBA,  difficulty breathing after eating this morning.  pt stated that he got "brain fog" after eating it. [place] sandwich from store, he ate between 3-5 am.   pt refusing temp in triage. pt also refusing to wear ID band.   pt talking in clear, full, sentences, no acute distress noted.

## 2025-06-03 NOTE — ED ADULT NURSE REASSESSMENT NOTE - NS ED NURSE REASSESS COMMENT FT1
Patient received from off going RN GUERDA Justice, a/o x4 and standing in [address] way across from his assigned bed 36 B. Patient without ID band on his person, RN states patient refused to wear and also refused ekg ordered at triage as he wants everything wiped down from top to bottom before touching his body. Patient admits to " I cant catch my breath" when asked how he is doing. Vital signs taken is stable after equipment wiped off in his presence with md at bedside. Patient still refusing to wear his ID band, will keep it close to his person despite efforts by MD and myself. Patient also refusing blood test even after spoken to by DR. ALEXIS Previously Declined (within the last year)

## 2025-06-03 NOTE — ED PROVIDER NOTE - NSFOLLOWUPCLINICS_GEN_ALL_ED_FT
Cardiology at [place] ([facility])  Cardiology  [address], [address]  [place] [address]  Phone: [phone]  Fax:     Cardiology at [facility]  Cardiology  [address], [address] [place] [address]  Phone: [phone]  Fax:     Cardiology at [facility]  Cardiology  [address] [place] [address]  Phone: [phone]  Fax:     Cardiology at [facility]  Cardiology  [address] [place] [address]  Phone: [phone]  Fax:     [facility]  Cardiology  [address], [address]  [place] [address]  Phone: [phone]  Fax:      Cardiology at [place] ([facility])  Cardiology  [address], [address]  [place] [address]  Phone: [phone]  Fax:     Cardiology at [facility]  Cardiology  [address], [address] [place] [address]  Phone: [phone]  Fax:     Cardiology at [facility]  Cardiology  [address] [place] [address]  Phone: [phone]  Fax:     Cardiology at [facility]  Cardiology  [address] [place] [address]  Phone: [phone]  Fax:     [facility]  Cardiology  [address] [facility], [address]  [place] [address]  Phone: [phone]  Fax:     [facility]  Allergy  [address] [place] [address]  Phone: [phone]  Fax:

## 2025-06-03 NOTE — ED PROVIDER NOTE - NSFOLLOWUPINSTRUCTIONS_ED_ALL_ED_FT
1. TAKE ALL MEDICATIONS AS DIRECTED.  FOR PAIN YOU CAN TAKE IBUPROFEN (MOTRIN, ADVIL) OR ACETAMINOPHEN (TYLENOL) AS NEEDED, AS DIRECTED ON PACKAGING.  2. FOLLOW UP WITH ____your primary care doctor______ AS DIRECTED.  YOU WERE GIVEN COPIES OF ALL LABS AND IMAGING RESULTS FROM YOUR ER VISIT--PLEASE TAKE THEM WITH YOU TO YOUR APPOINTMENT.  3. IF NEEDED, CALL [phone] TO FIND A PRIMARY CARE PHYSICIAN.  OR CALL [phone] TO MAKE AN APPOINTMENT WITH THE MEDICAL CLINIC.  4. RETURN TO THE ER FOR ANY WORSENING SYMPTOMS.

## 2025-06-03 NOTE — ED PROVIDER NOTE - PROGRESS NOTE DETAILS
LIA Giordano NP Progress Note: Patient re-evaluated. Vitals stable, Spo2 100% on room air. pt with no resp distress or increased WOB. Sitting comfortably in  style on bed playing on cell phone. Lungs CTA bilaterally. Images and labs reviewed from this am as well as this visist. No acute findings.  Patient appears emotionally distressed over this. Emotional support provided. Pt reports he is still feeling SOB. Still feels like he cant breathe normally however on exam pt in NAD speaking in full sentences, RR 14 even and unlabored, SPo2 100% on room ai.r. LIA Giordano NP Progress Note: Patient continues to express feeling SOB. States he is afraid to eat because he doesn't want his throat to close up. He reports fullness in his chest. Denies reflux symptoms. Sat remains 100% on rom air. Respirations remain even and unlabored. He also is expressing that he has not been the same since he was given Invega (injectable antipsychotic) during his last admission to inpatient psych. He reports his body is still under the effects of the drug even though he has not had a dose over a year. He seems hyperfocused on the drug and its side effects. He said when he ate the turkey sandwich all the sensations he was feeling when on Invega came back. I reassured patient of the negative workup done here today as well as earlier. I reassured him his vitals are stable. I offered pepcid but pt does not want to "mix mediation" since he already took the albuterol.I encouraged pt to follow up with his PCP if symptoms persist but that all high risk diagnosis such as MI PE have been ruled out.

## 2025-06-03 NOTE — ED PROVIDER NOTE - NSFOLLOWUPCLINICSTOKEN_GEN_ALL_ED_FT
[id number]: || ||00\01||False;[id number]: || ||00\01||False;[id number]: || ||00\01||False;154556: || ||00\01||False;534874: || ||00\01||False; 859370: || ||00\01||False;[id number]: || ||00\01||False;364363: || ||00\01||False;626509: || ||00\01||False;758125: || ||00\01||False;910441: || ||00\01||False;

## 2025-06-03 NOTE — ED ADULT NURSE NOTE - OBJECTIVE STATEMENT
25Y A&OX3 M PMH schizophrenia c/o difficulty breathing and "brain fog"  X 1 week. pt states he bought a turkey sandwich last week and believes it was tampered with and is causing his symptoms. states was on antipsychotic medications and stopped taking shots about 7 months ago. pt refusing to wear ID band, refusing EKG, slightly restless.

## 2025-06-03 NOTE — ED ADULT NURSE NOTE - NSFALLUNIVINTERV_ED_ALL_ED
Bed/Stretcher in lowest position, wheels locked, appropriate side rails in place/Call bell, personal items and telephone in reach/Instruct patient to call for assistance before getting out of bed/chair/stretcher/Non-slip footwear applied when patient is off stretcher/[place] to call system/Physically safe environment - no spills, clutter or unnecessary equipment/Purposeful proactive rounding/Room/bathroom lighting operational, light cord in reach

## 2025-06-03 NOTE — ED PROVIDER NOTE - PATIENT PORTAL LINK FT
You can access the FollowMyHealth Patient Portal offered by [facility] by registering at the following website: [email or web address]. By joining [facility]’s FollowMyHealth portal, you will also be able to view your health information using other applications (apps) compatible with our system.

## 2025-06-03 NOTE — ED ADULT NURSE NOTE - NSFALLUNIVINTERV_ED_ALL_ED
Bed/Stretcher in lowest position, wheels locked, appropriate side rails in place/Call bell, personal items and telephone in reach/Instruct patient to call for assistance before getting out of bed/chair/stretcher/Non-slip footwear applied when patient is off stretcher/[place] to call system/Physically safe environment - no spills, clutter or unnecessary equipment/Purposeful proactive rounding/Room/bathroom lighting operational, light cord in reach

## 2025-06-03 NOTE — ED ADULT NURSE NOTE - CHIEF COMPLAINT QUOTE
BIBA,  difficulty breathing after eating this morning.  pt stated that he got "brain fog" after eating it. [place] sandwich from store, he ate between 3-5 am.   pt refusing temp in triage. pt also refusing to wear ID band.   pt talking in clear, full, sentences, no acute distress noted.

## 2025-06-03 NOTE — ED ADULT TRIAGE NOTE - CHIEF COMPLAINT QUOTE
BIBA from home for shortness of breath, patient states "I still can't breath right" patient was seen here earlier today for the same reason and signed out AMA earlier after being seen again. patient is able to talk in full sentences

## 2025-06-03 NOTE — ED PROVIDER NOTE - ATTENDING APP SHARED VISIT CONTRIBUTION OF CARE
I have personally performed a face to face medical and diagnostic evaluation of the patient. I have discussed with and reviewed the RODNEY's note and agree with the History, ROS, Physical Exam and MDM unless otherwise indicated. A brief summary of my personal evaluation and impression can be found in the MDM. I actively participated in the comanagement of this patient with the RODNEY. I have personally reviewed all orders, study/imaging results, medication orders. I discussed indications for consultant evaluation and consultant recommendations with the RODNEY when applicable, and have discussed the disposition plan with the RODNEY.

## 2025-06-03 NOTE — ED PROVIDER NOTE - CLINICAL SUMMARY MEDICAL DECISION MAKING FREE TEXT BOX
DO Andres: 25-year-old male with no past medical history presenting with shortness of breath. Patient states this morning he ate a turkey sandwich. Soon afterwards patient developed shortness of breath. Patient was seen in the emergency department this morning and left against medical advice. Patient states since leaving the emergency department today his symptoms have not resolved. Patient states he has intermittent left-sided chest pain. Pain is not worse with exertion or taking deep breath. Denies throat swelling, mouth swelling, fevers, vomiting, nausea, abdominal pain. Physical exam reveals well-appearing male, heart rate regular, clear breath sounds bilaterally, soft nontender abdomen, no rashes on skin, no oropharyngeal edema. Low suspicion for allergic reaction at this time. Will repeat blood work. CBC, CMP, D-dimer, flu COVID test, troponin, EKG. Will order albuterol inhaler for symptom, however patient is not wheezing.

## 2025-06-03 NOTE — ED PROVIDER NOTE - NSFOLLOWUPINSTRUCTIONS_ED_ALL_ED_FT
You were seen in the Emergency Department for shortness of breath. You decided to leave against medical advice.    1) Advance activity as tolerated.   2) Continue all previously prescribed medications as directed.    3) Follow up with any emergency department to finish your evaluation and follow up with your primary care physician as soon as possible - take copies of your results.    4) Return to the Emergency Department for worsening or persistent symptoms, and/or ANY NEW OR CONCERNING SYMPTOMS.    Shortness of breath    Shortness of breath (dyspnea) means you have trouble breathing and could indicate a medical problem. Causes include lung disease, heart disease, low amount of red blood cells (anemia), poor physical fitness, being overweight, smoking, etc. Your health care provider today may not be able to find a cause for your shortness of breath after your exam. In this case, it is important to have a follow-up exam with your primary care physician as instructed. If medicines were prescribed, take them as directed for the full length of time directed. Refrain from tobacco products.    SEEK IMMEDIATE MEDICAL CARE IF YOU HAVE ANY OF THE FOLLOWING SYMPTOMS: worsening shortness of breath, chest pain, back pain, abdominal pain, fever, coughing up blood, lightheadedness/dizziness.

## 2025-06-03 NOTE — ED PROVIDER NOTE - PHYSICAL EXAMINATION
General: Appears well and nontoxic  Mentation: A&O x 3  psych: mood appropriate  HEENT: airway patent, conjunctivae clear bilaterally  Resp: symmetric chest rise, no resp distress, breath sounds CTA bilaterally  Cardio: RRR, no m/r/g  GI: soft/nondistended/nontender  Neuro: sensation and motor function grossly intact  Skin: no cyanosis, no jaundice  MSK: normal movement of all extremities  Lymph/Vasc: no AGUSTINA edema

## 2025-06-04 ENCOUNTER — EMERGENCY (EMERGENCY)
Facility: HOSPITAL | Age: 25
LOS: 0 days | Discharge: HOME | End: 2025-06-04
Attending: EMERGENCY MEDICINE
Payer: MEDICAID

## 2025-06-04 VITALS
SYSTOLIC BLOOD PRESSURE: 115 MMHG | TEMPERATURE: 98 F | OXYGEN SATURATION: 99 % | WEIGHT: 179.9 LBS | HEART RATE: 104 BPM | RESPIRATION RATE: 18 BRPM | HEIGHT: 76 IN | DIASTOLIC BLOOD PRESSURE: 77 MMHG

## 2025-06-04 VITALS
SYSTOLIC BLOOD PRESSURE: 147 MMHG | DIASTOLIC BLOOD PRESSURE: 80 MMHG | RESPIRATION RATE: 18 BRPM | TEMPERATURE: 98 F | OXYGEN SATURATION: 98 % | HEART RATE: 81 BPM

## 2025-06-04 DIAGNOSIS — R06.02 SHORTNESS OF BREATH: ICD-10-CM

## 2025-06-04 DIAGNOSIS — F20.9 SCHIZOPHRENIA, UNSPECIFIED: ICD-10-CM

## 2025-06-04 PROCEDURE — 99283 EMERGENCY DEPT VISIT LOW MDM: CPT

## 2025-06-04 NOTE — ED PROVIDER NOTE - ATTENDING CONTRIBUTION TO CARE
Patient evaluated and seen with PGY3 Dr Saint Bobby as a shared visit - agree with above history and physical - pt examined and seen by me personally - findings as seen: Pt with feeling of indigestion otherwise eating and swallowing and making stool without issue - will otherwise dc with outpt GI follow up as pt has had extensive workup in ED already for this issue.

## 2025-06-04 NOTE — ED ADULT TRIAGE NOTE - CHIEF COMPLAINT QUOTE
BIBA from home for shortness of breath states its not getting better, was seen here yesterday 3x for the same thing BIBA from home for shortness of breath states its not getting better, was seen here yesterday 3x for the same thing. PMH Psychosis

## 2025-06-04 NOTE — ED PROVIDER NOTE - CLINICAL SUMMARY MEDICAL DECISION MAKING FREE TEXT BOX
Saint Bobby, DO (PGY2): well-appearing 26 y/o male, with a history of schizophrenia, presenting to the ED today for difficulty breathing and SOB. Describes feeling something stuck below his stomach. Vitals stable. Patient satting well on room air. Physical exam benign (see above). Patient had extensive workup yesterday. No indication to repeat labs at this time. Advised patient to follow up with gastroenterology for endoscopy evaluate for structural abnormalities. Patient verbalized understanding. Will dc. Return precautions communicated. All questions answered.

## 2025-06-04 NOTE — ED ADULT TRIAGE NOTE - GLASGOW COMA SCALE: BEST MOTOR RESPONSE, MLM
Final Anesthesia Post-op Assessment    Patient: Jesu Gomez  Procedure(s) Performed: EGD  Anesthesia type: MAC    Vitals Value Taken Time   Temp 36.4 Â°C (97.5 Â°F) 07/18/22 1154   Pulse 83 07/18/22 1154   Resp 16 07/18/22 1154   SpO2 98 % 07/18/22 1154   /67 07/18/22 1154         mil#404 Anesthesiology Smoking Abstinence    The patient is a current smoker (e.g. cigarette, cigar, pipe, e-cigarette/vaping/marijuana)? No ()      #424 Perioperative Temperature Management    Anesthesia time was 60 minutes or longer? No (4256F)      #477 Multimodal Pain Management    The Case is Emergent - Exclusion No Patient was NOT administered multimodal pain management (2 or more, non-opioid, between 6 hours prior to anesthesia and discharge from PACU) Reason not listed ()      #430 ADULT prevention o  f Post-Operative Nausea & Vomiting (PONV) - Combination Therapy (18 years and older)    Patient received an inhalational anesthetic? No ()      #463 PEDIATRIC Prevention of Post-Operative Nausea & Vomiting (PONV) - Combination Therapy (17 years and younger)    The inhalational anesthetic was only used for induction? No Patient did NOT receive an inhalational anesthetic ()      #76 Prevention of Central Venous Catheter (CVC) - Related Bloodstream Infections     Patient had a central line placed? No, a central line was not placed during this case. Optimetrix Number:       No complications documented.
(M6) obeys commands

## 2025-06-04 NOTE — ED ADULT NURSE REASSESSMENT NOTE - NS ED NURSE REASSESS COMMENT FT1
Pt AAOx4, ambulating with steady gait. Pt requesting medicaid cab back to address in patient chart. Address verified. Pt VSS.

## 2025-06-04 NOTE — ED PROVIDER NOTE - NSPTACCESSSVCSAPPTDETAILS_ED_ALL_ED_FT
3 ED visits for feeling of food stuck in throat    Please obtain GI appointment for patient preferably within next week. Thanks so much!

## 2025-06-04 NOTE — ED PROVIDER NOTE - IN ACCORDANCE WITH [place] STATE LAW, WE OFFER EVERY PATIENT A HEPATITIS C TEST. WOULD YOU LIKE TO BE TESTED TODAY?
Needs to establish care with a Primary Care Physician that is accepting new patients.       ACTIVE PATIENT PORTAL MESSAGE SENT   Opt out

## 2025-06-04 NOTE — ED ADULT NURSE NOTE - OBJECTIVE STATEMENT
Pt is a 26yo Male AAOx4 NKDA pmh bipolar, IBS, pyschosis pw sob, lump in the throat for the past three days. Pt respirations equal and unlabored bilaterally on exam. Pt denies any cp, difficulty breathing, dizziness, abd pain, n/v/d, coughs, recent travel, sick contacts at this time. Pt updated on plan of care. FAZAL ENNIS.

## 2025-06-04 NOTE — ED PROVIDER NOTE - NSFOLLOWUPINSTRUCTIONS_ED_ALL_ED_FT
You were seen in the emergency department for difficulty breathing and the feeling that food gets stuck in your throat after eating. At this time your clinical evaluation and history do not demonstrate any acute, life-threatening medical conditions warranting emergent treatment.     As discussed, please follow up with a gastroenterologist regarding your symptoms. You will receive a phone to schedule an appointment. In case you do not, please call  [phone] to schedule one.    - You may take 500-1000 mg acetaminophen (Tylenol) every 6 hours, as needed for pain. Do not take more than 4000 mg in a 24 hour period. Be aware many over the counter and prescription medications also contain acetaminophen (Tylenol).  - You may take 600 mg ibuprofen every 8 hours, with food, as needed for pain.    PLEASE RETURN TO THE EMERGENCY DEPARTMENT if you experience uncontrollable nausea/vomiting, constipation, loss of consciousness, or for any new concerns.    We hope you feel better! Thank you for trusting us with your care!

## 2025-06-04 NOTE — ED PROVIDER NOTE - PATIENT PORTAL LINK FT
You can access the FollowMyHealth Patient Portal offered by Health system by registering at the following website: [email or web address]. By joining [facility]’s FollowMyHealth portal, you will also be able to view your health information using other applications (apps) compatible with our system.

## 2025-06-04 NOTE — ED PROVIDER NOTE - OBJECTIVE STATEMENT
Saint Bobby, DO (PGY2): 24 y/o male, with a history of schizophrenia, presenting to the ED today for difficulty breathing and SOB. Patient reports that for the last few days, he has been having this feeling that something is stuck right below his stomach whenever he eats. Feels like he gets short of breath when he eats as well. No chest pain or recent infectious symptoms. Patient was see in the ED yesterday for the same. Workup, including troponin and d-dimer, was negative. Chest xray was also negative for acute pathology. Otherwise, no new symptoms reported by patients. States that he is back today because he is not better.

## 2025-06-04 NOTE — ED ADULT NURSE NOTE - CHIEF COMPLAINT QUOTE
BIBA from home for shortness of breath states its not getting better, was seen here yesterday 3x for the same thing. PMH Psychosis

## 2025-06-04 NOTE — ED PROVIDER NOTE - PHYSICAL EXAMINATION
GENERAL: no acute distress, non-toxic appearing  HEAD: normocephalic, atraumatic  HEENT: oral mucosa moist, full ROM of neck. Oropharayn clear, no neck swelling  CARDIAC: regular rate rhythm, normal S1/S2  CHEST: CTA BL, no wheeze or crackles  ABDOMEN: normal BS, soft, no tenderness  EXTREMITY: no gross deformity, no edema, good perfusion   NEURO: alert and orientedx3, no focal deficits, ambulating with no issues

## 2025-06-04 NOTE — ED ADULT NURSE REASSESSMENT NOTE - NS ED NURSE REASSESS COMMENT FT1
Pt AAOx4, ambulating with steady gait. Pt assisted to white [employer] uber plate [id number]. Pt destination address verified. Pt uber observed safely leaving [facility].

## 2025-06-06 ENCOUNTER — EMERGENCY (EMERGENCY)
Facility: HOSPITAL | Age: 25
LOS: 0 days | Discharge: LEFT BEFORE DISCHARGE | End: 2025-06-06
Attending: STUDENT IN AN ORGANIZED HEALTH CARE EDUCATION/TRAINING PROGRAM

## 2025-06-06 VITALS
TEMPERATURE: 98 F | HEART RATE: 64 BPM | SYSTOLIC BLOOD PRESSURE: 158 MMHG | HEIGHT: 76 IN | RESPIRATION RATE: 20 BRPM | DIASTOLIC BLOOD PRESSURE: 89 MMHG | OXYGEN SATURATION: 100 %

## 2025-06-06 DIAGNOSIS — F20.9 SCHIZOPHRENIA, UNSPECIFIED: ICD-10-CM

## 2025-06-06 DIAGNOSIS — R06.02 SHORTNESS OF BREATH: ICD-10-CM

## 2025-06-06 DIAGNOSIS — R05.9 COUGH, UNSPECIFIED: ICD-10-CM

## 2025-06-06 DIAGNOSIS — Z53.29 PROCEDURE AND TREATMENT NOT CARRIED OUT BECAUSE OF PATIENT'S DECISION FOR OTHER REASONS: ICD-10-CM

## 2025-06-06 PROCEDURE — 99283 EMERGENCY DEPT VISIT LOW MDM: CPT

## 2025-06-06 NOTE — ED ADULT NURSE NOTE - OBJECTIVE STATEMENT
Patient Presents c/o SOB. Patient states he feels SOB. Patient refused to sit in stretcher, prefers standing. Patient refused RN assessment, irritable, not answering any pertinent questions.

## 2025-06-06 NOTE — ED PROVIDER NOTE - CLINICAL SUMMARY MEDICAL DECISION MAKING FREE TEXT BOX
pt with hx of bipolar and schizophrenia presents to ED stating is short of breath. Very gaurded. does not want to elaborate on symptoms. says to me "shouldn't you know why I am short of breath" his vitals are normal. no hypoxia.     on exam   Constitutional: NAD AAOx3  Eyes: PERRLA EOMI  Head: Normocephalic atraumatic  Mouth: MMM  Cardiac: regular rate   Resp: No respiratory distress. comfortable appearing. no wob.   GI: Abd s/nt/nd  Neuro: CN2-12 intact, strength is 5/5 in all extremities.   Skin: No visible rashes  Psych: paranoid and guarded.     pt offered multiple times full history taking and exam but patient not agreeable to that. states he just wants an answer for his shortness of breath. pt changed his mind and left the ED.

## 2025-06-06 NOTE — ED ADULT NURSE NOTE - CHPI ED NUR SYMPTOMS NEG
no body aches/no chills/no cough/no diaphoresis/no edema/no fever/no headache/no hemoptysis/no wheezing
I will SWITCH the dose or number of times a day I take the medications listed below when I get home from the hospital:  None